# Patient Record
Sex: MALE | Race: OTHER | NOT HISPANIC OR LATINO | URBAN - METROPOLITAN AREA
[De-identification: names, ages, dates, MRNs, and addresses within clinical notes are randomized per-mention and may not be internally consistent; named-entity substitution may affect disease eponyms.]

---

## 2021-08-21 ENCOUNTER — EMERGENCY (EMERGENCY)
Facility: HOSPITAL | Age: 41
LOS: 1 days | Discharge: HOME | End: 2021-08-21
Attending: EMERGENCY MEDICINE
Payer: COMMERCIAL

## 2021-08-21 VITALS
RESPIRATION RATE: 17 BRPM | HEIGHT: 67 IN | OXYGEN SATURATION: 97 % | TEMPERATURE: 98 F | HEART RATE: 83 BPM | WEIGHT: 190.04 LBS | SYSTOLIC BLOOD PRESSURE: 128 MMHG | DIASTOLIC BLOOD PRESSURE: 87 MMHG

## 2021-08-21 VITALS
RESPIRATION RATE: 18 BRPM | OXYGEN SATURATION: 100 % | DIASTOLIC BLOOD PRESSURE: 92 MMHG | TEMPERATURE: 98 F | HEART RATE: 68 BPM | SYSTOLIC BLOOD PRESSURE: 139 MMHG

## 2021-08-21 LAB
ALBUMIN SERPL ELPH-MCNC: 4.4 G/DL — SIGNIFICANT CHANGE UP (ref 3.3–5)
ALP SERPL-CCNC: 71 U/L — SIGNIFICANT CHANGE UP (ref 40–120)
ALT FLD-CCNC: 18 U/L — SIGNIFICANT CHANGE UP (ref 10–45)
ANION GAP SERPL CALC-SCNC: 14 MMOL/L — SIGNIFICANT CHANGE UP (ref 5–17)
APPEARANCE UR: CLEAR — SIGNIFICANT CHANGE UP
AST SERPL-CCNC: 15 U/L — SIGNIFICANT CHANGE UP (ref 10–40)
BACTERIA # UR AUTO: NEGATIVE — SIGNIFICANT CHANGE UP
BASE EXCESS BLDV CALC-SCNC: 0 MMOL/L — SIGNIFICANT CHANGE UP (ref -2–2)
BASOPHILS # BLD AUTO: 0.08 K/UL — SIGNIFICANT CHANGE UP (ref 0–0.2)
BASOPHILS NFR BLD AUTO: 1 % — SIGNIFICANT CHANGE UP (ref 0–2)
BILIRUB SERPL-MCNC: 0.4 MG/DL — SIGNIFICANT CHANGE UP (ref 0.2–1.2)
BILIRUB UR-MCNC: NEGATIVE — SIGNIFICANT CHANGE UP
BUN SERPL-MCNC: 22 MG/DL — SIGNIFICANT CHANGE UP (ref 7–23)
CA-I SERPL-SCNC: 1.19 MMOL/L — SIGNIFICANT CHANGE UP (ref 1.15–1.33)
CALCIUM SERPL-MCNC: 9.5 MG/DL — SIGNIFICANT CHANGE UP (ref 8.4–10.5)
CHLORIDE BLDV-SCNC: 106 MMOL/L — SIGNIFICANT CHANGE UP (ref 96–108)
CHLORIDE SERPL-SCNC: 105 MMOL/L — SIGNIFICANT CHANGE UP (ref 96–108)
CO2 BLDV-SCNC: 27 MMOL/L — HIGH (ref 22–26)
CO2 SERPL-SCNC: 20 MMOL/L — LOW (ref 22–31)
COLOR SPEC: COLORLESS — SIGNIFICANT CHANGE UP
CREAT SERPL-MCNC: 0.87 MG/DL — SIGNIFICANT CHANGE UP (ref 0.5–1.3)
DIFF PNL FLD: ABNORMAL
EOSINOPHIL # BLD AUTO: 0.3 K/UL — SIGNIFICANT CHANGE UP (ref 0–0.5)
EOSINOPHIL NFR BLD AUTO: 3.9 % — SIGNIFICANT CHANGE UP (ref 0–6)
EPI CELLS # UR: 0 /HPF — SIGNIFICANT CHANGE UP
GAS PNL BLDV: 138 MMOL/L — SIGNIFICANT CHANGE UP (ref 136–145)
GAS PNL BLDV: SIGNIFICANT CHANGE UP
GAS PNL BLDV: SIGNIFICANT CHANGE UP
GLUCOSE BLDV-MCNC: 88 MG/DL — SIGNIFICANT CHANGE UP (ref 70–99)
GLUCOSE SERPL-MCNC: 96 MG/DL — SIGNIFICANT CHANGE UP (ref 70–99)
GLUCOSE UR QL: NEGATIVE — SIGNIFICANT CHANGE UP
HCO3 BLDV-SCNC: 25 MMOL/L — SIGNIFICANT CHANGE UP (ref 22–29)
HCT VFR BLD CALC: 39.3 % — SIGNIFICANT CHANGE UP (ref 39–50)
HCT VFR BLDA CALC: 42 % — SIGNIFICANT CHANGE UP (ref 39–51)
HGB BLD CALC-MCNC: 13.9 G/DL — SIGNIFICANT CHANGE UP (ref 12.6–17.4)
HGB BLD-MCNC: 13.6 G/DL — SIGNIFICANT CHANGE UP (ref 13–17)
HYALINE CASTS # UR AUTO: 0 /LPF — SIGNIFICANT CHANGE UP (ref 0–2)
IMM GRANULOCYTES NFR BLD AUTO: 0.4 % — SIGNIFICANT CHANGE UP (ref 0–1.5)
KETONES UR-MCNC: ABNORMAL
LACTATE BLDV-MCNC: 0.7 MMOL/L — SIGNIFICANT CHANGE UP (ref 0.7–2)
LEUKOCYTE ESTERASE UR-ACNC: NEGATIVE — SIGNIFICANT CHANGE UP
LYMPHOCYTES # BLD AUTO: 1.85 K/UL — SIGNIFICANT CHANGE UP (ref 1–3.3)
LYMPHOCYTES # BLD AUTO: 24.3 % — SIGNIFICANT CHANGE UP (ref 13–44)
MCHC RBC-ENTMCNC: 29.9 PG — SIGNIFICANT CHANGE UP (ref 27–34)
MCHC RBC-ENTMCNC: 34.6 GM/DL — SIGNIFICANT CHANGE UP (ref 32–36)
MCV RBC AUTO: 86.4 FL — SIGNIFICANT CHANGE UP (ref 80–100)
MONOCYTES # BLD AUTO: 0.73 K/UL — SIGNIFICANT CHANGE UP (ref 0–0.9)
MONOCYTES NFR BLD AUTO: 9.6 % — SIGNIFICANT CHANGE UP (ref 2–14)
NEUTROPHILS # BLD AUTO: 4.63 K/UL — SIGNIFICANT CHANGE UP (ref 1.8–7.4)
NEUTROPHILS NFR BLD AUTO: 60.8 % — SIGNIFICANT CHANGE UP (ref 43–77)
NITRITE UR-MCNC: NEGATIVE — SIGNIFICANT CHANGE UP
NRBC # BLD: 0 /100 WBCS — SIGNIFICANT CHANGE UP (ref 0–0)
PCO2 BLDV: 43 MMHG — SIGNIFICANT CHANGE UP (ref 42–55)
PH BLDV: 7.38 — SIGNIFICANT CHANGE UP (ref 7.32–7.43)
PH UR: 6 — SIGNIFICANT CHANGE UP (ref 5–8)
PLATELET # BLD AUTO: 288 K/UL — SIGNIFICANT CHANGE UP (ref 150–400)
PO2 BLDV: 46 MMHG — HIGH (ref 25–45)
POTASSIUM BLDV-SCNC: 3.7 MMOL/L — SIGNIFICANT CHANGE UP (ref 3.5–5.1)
POTASSIUM SERPL-MCNC: 3.5 MMOL/L — SIGNIFICANT CHANGE UP (ref 3.5–5.3)
POTASSIUM SERPL-SCNC: 3.5 MMOL/L — SIGNIFICANT CHANGE UP (ref 3.5–5.3)
PROT SERPL-MCNC: 7 G/DL — SIGNIFICANT CHANGE UP (ref 6–8.3)
PROT UR-MCNC: NEGATIVE — SIGNIFICANT CHANGE UP
RBC # BLD: 4.55 M/UL — SIGNIFICANT CHANGE UP (ref 4.2–5.8)
RBC # FLD: 12.1 % — SIGNIFICANT CHANGE UP (ref 10.3–14.5)
RBC CASTS # UR COMP ASSIST: 0 /HPF — SIGNIFICANT CHANGE UP (ref 0–4)
SAO2 % BLDV: 80.9 % — SIGNIFICANT CHANGE UP (ref 67–88)
SODIUM SERPL-SCNC: 139 MMOL/L — SIGNIFICANT CHANGE UP (ref 135–145)
SP GR SPEC: 1.01 — LOW (ref 1.01–1.02)
UROBILINOGEN FLD QL: NEGATIVE — SIGNIFICANT CHANGE UP
WBC # BLD: 7.62 K/UL — SIGNIFICANT CHANGE UP (ref 3.8–10.5)
WBC # FLD AUTO: 7.62 K/UL — SIGNIFICANT CHANGE UP (ref 3.8–10.5)
WBC UR QL: 2 /HPF — SIGNIFICANT CHANGE UP (ref 0–5)

## 2021-08-21 PROCEDURE — 76775 US EXAM ABDO BACK WALL LIM: CPT

## 2021-08-21 PROCEDURE — 76775 US EXAM ABDO BACK WALL LIM: CPT | Mod: 26

## 2021-08-21 PROCEDURE — 76857 US EXAM PELVIC LIMITED: CPT

## 2021-08-21 PROCEDURE — 84132 ASSAY OF SERUM POTASSIUM: CPT

## 2021-08-21 PROCEDURE — 84295 ASSAY OF SERUM SODIUM: CPT

## 2021-08-21 PROCEDURE — 82565 ASSAY OF CREATININE: CPT

## 2021-08-21 PROCEDURE — 81001 URINALYSIS AUTO W/SCOPE: CPT

## 2021-08-21 PROCEDURE — 99284 EMERGENCY DEPT VISIT MOD MDM: CPT | Mod: 25

## 2021-08-21 PROCEDURE — 85018 HEMOGLOBIN: CPT

## 2021-08-21 PROCEDURE — 82803 BLOOD GASES ANY COMBINATION: CPT

## 2021-08-21 PROCEDURE — 85025 COMPLETE CBC W/AUTO DIFF WBC: CPT

## 2021-08-21 PROCEDURE — 82435 ASSAY OF BLOOD CHLORIDE: CPT

## 2021-08-21 PROCEDURE — 82947 ASSAY GLUCOSE BLOOD QUANT: CPT

## 2021-08-21 PROCEDURE — 83605 ASSAY OF LACTIC ACID: CPT

## 2021-08-21 PROCEDURE — 99285 EMERGENCY DEPT VISIT HI MDM: CPT

## 2021-08-21 PROCEDURE — 96360 HYDRATION IV INFUSION INIT: CPT

## 2021-08-21 PROCEDURE — 96361 HYDRATE IV INFUSION ADD-ON: CPT

## 2021-08-21 PROCEDURE — 87086 URINE CULTURE/COLONY COUNT: CPT

## 2021-08-21 PROCEDURE — 82330 ASSAY OF CALCIUM: CPT

## 2021-08-21 PROCEDURE — 85014 HEMATOCRIT: CPT

## 2021-08-21 PROCEDURE — 80053 COMPREHEN METABOLIC PANEL: CPT

## 2021-08-21 PROCEDURE — 76770 US EXAM ABDO BACK WALL COMP: CPT

## 2021-08-21 RX ORDER — SODIUM CHLORIDE 9 MG/ML
1000 INJECTION INTRAMUSCULAR; INTRAVENOUS; SUBCUTANEOUS ONCE
Refills: 0 | Status: COMPLETED | OUTPATIENT
Start: 2021-08-21 | End: 2021-08-21

## 2021-08-21 RX ORDER — TAMSULOSIN HYDROCHLORIDE 0.4 MG/1
0.4 CAPSULE ORAL ONCE
Refills: 0 | Status: COMPLETED | OUTPATIENT
Start: 2021-08-21 | End: 2021-08-21

## 2021-08-21 RX ORDER — TAMSULOSIN HYDROCHLORIDE 0.4 MG/1
1 CAPSULE ORAL
Qty: 10 | Refills: 0
Start: 2021-08-21 | End: 2021-08-30

## 2021-08-21 RX ADMIN — SODIUM CHLORIDE 1000 MILLILITER(S): 9 INJECTION INTRAMUSCULAR; INTRAVENOUS; SUBCUTANEOUS at 07:00

## 2021-08-21 RX ADMIN — SODIUM CHLORIDE 1000 MILLILITER(S): 9 INJECTION INTRAMUSCULAR; INTRAVENOUS; SUBCUTANEOUS at 04:39

## 2021-08-21 RX ADMIN — SODIUM CHLORIDE 1000 MILLILITER(S): 9 INJECTION INTRAMUSCULAR; INTRAVENOUS; SUBCUTANEOUS at 02:44

## 2021-08-21 RX ADMIN — TAMSULOSIN HYDROCHLORIDE 0.4 MILLIGRAM(S): 0.4 CAPSULE ORAL at 04:38

## 2021-08-21 NOTE — ED PROVIDER NOTE - NS ED ROS FT
CONSTITUTIONAL: No fevers, no chills, no lightheadedness, no dizziness  Eyes: no visual changes  Ears: no ear drainage, no ear pain  Nose: no nasal congestion  Mouth/Throat: no sore throat  CV: No chest pain, no palpitations  PULM: No SOB, no cough  GI: No n/v/d/c, Suprapubic abd pain  : dysuria, no hematuria  SKIN: no rashes.  NEURO: no headache, no focal weakness or numbness  LYMPH/VASC: no LE swelling

## 2021-08-21 NOTE — ED ADULT NURSE NOTE - OBJECTIVE STATEMENT
41y male from triage, AAOx3, no PMH, complaining of 3w of urinary symptoms, frequency, lower abd pain, went to urgent care who ordered outpt CT, CT showed hydronephrosis and a non obstructing stone, denies headache, chest pain, shortness of breath, n/v/d, moves all extremities w/+ pulses, amb ind, bed in lowest position, comfort and safety provided.

## 2021-08-21 NOTE — ED PROVIDER NOTE - PATIENT PORTAL LINK FT
You can access the FollowMyHealth Patient Portal offered by [facility] by registering at the following website: [email or web address]. By joining [facility]’s FollowMyHealth portal, you will also be able to view your health information using other applications (apps) compatible with our system.

## 2021-08-21 NOTE — ED PROVIDER NOTE - PHYSICAL EXAMINATION
gen: well appearing  Mentation: AAO x 3  psych: mood appropriate  ENT: airway patent  Eyes: conjunctivae clear bilaterally  Cardio: RRR, no m/r/g  Resp: normal BS b/l  GI: Mild suprapubic tenderness, soft/nondistended  : no CVA tenderness  Neuro: sensation and motor function intact, CN 2-12 intact  Skin: No evidence of rash  MSK: normal movement of all extremities  Lymph/Vasc: no LE edema

## 2021-08-21 NOTE — ED PROVIDER NOTE - PROGRESS NOTE DETAILS
Jolly: ultrasound with non-obstructing stones in left kidney. mild hydronephrosis. UA negative for infection. reviewed case with urology. to d/c home with urology follow up

## 2021-08-21 NOTE — ED PROVIDER NOTE - CLINICAL SUMMARY MEDICAL DECISION MAKING FREE TEXT BOX
Patient has abdominal pain with an 8mm calculus at the left ureterovesicular junction. Will get labs, vbg, urinalysis, urine culture, provide IV fluids, and pain control. Patient has abdominal pain with an 8mm calculus at the left ureterovesicular junction. Will get labs, vbg, urinalysis, urine culture, provide IV fluids, and pain control.  -discussed case with urology. to get ultrasound  -ultrasound shows multiple kidney stones in left kidney, with no obstruction  -mild hydronephrosis  -UA negative for infection  -discussed case with urology, to discharge with immediate follow up and conservative management

## 2021-08-21 NOTE — ED PROVIDER NOTE - ATTENDING CONTRIBUTION TO CARE
Attending MD Bravo: I personally have seen and examined this patient.  Resident note reviewed and agree on plan of care and except where noted.  See below for details.     Seen in [place] [facility]    41M with no reported PMH/PSH/Meds/Allergies presents to the ED with outpatient CT (with oral, without IV contrast) showing nephrolithiasis "mild-moderate L hydroureteronephrosis to the level of an approximate 0.8cm calculus ipsilateral UVJ with the surrounding soft tissue density in the region, possibly hemorrhage edema.  Underlying mass cannot be excluded.  Additional bilateral subcentimeter nonobstructing renal calculi.  Recommend CT urogram to exclude underlying lesion L bladder base."  Also noted to have bilateral <1cm noncalcified pulm nodules.  Reports developed pain 3-4 weeks ago, reports worse last week on Tuesday and then last night.  Reports had pain in the mid to L abdomen, intermittent.  Reports presented to Urgent Care in [place] on Tuesday and was scheduled for CT today.  Denies chest pain, shortness of breath, palpitations. Denies nausea, vomiting, diarrhea, bloody or black stools. Denies weight loss, weight gain.  Denies night sweats.  Denies dysuria, hematuria, reports when he has the pain in abdomen it is associated with dysuria and testicular pain which dissipates when the abdominal pain dissipates.  Denies fevers, chills.  Reports travelled to [place] two weeks ago.  Reports sexually active, with one female partner, denies history of STIs. COVID vaccinated x 2 Pfizer.  A ten (10) point review of systems was negative other than as stated in the HPI or elsewhere in the chart.     Exam:   General: NAD  HENT: head NCAT, airway patent   Eyes: anicteric, no conjunctival injection  Lungs: lungs CTAB with good inspiratory effort, no wheezing, no rhonchi, no rales  Cardiac: +S1S2, no m/r/g  GI: abdomen soft with +BS, NT, ND  : no CVAT, ED Tech [place] chaperoned no testicular tenderness, no blood or purulence at meatus  MSK: FROM at neck  Neuro: moving all extremities spontaneously, sensory grossly intact, no gross neuro deficits  Psych: normal mood and affect     A/P: 41M with nephrolithiasis     TO BE COMPLETED Attending MD Bravo: I personally have seen and examined this patient.  Resident note reviewed and agree on plan of care and except where noted.  See below for details.     Seen in [place] [facility]    41M with no reported PMH/PSH/Meds/Allergies presents to the ED with outpatient CT (with oral, without IV contrast) showing nephrolithiasis "mild-moderate L hydroureteronephrosis to the level of an approximate 0.8cm calculus ipsilateral UVJ with the surrounding soft tissue density in the region, possibly hemorrhage edema.  Underlying mass cannot be excluded.  Additional bilateral subcentimeter nonobstructing renal calculi.  Recommend CT urogram to exclude underlying lesion L bladder base."  Also noted to have bilateral <1cm noncalcified pulm nodules.  Reports developed pain 3-4 weeks ago, reports worse last week on Tuesday and then last night.  Reports had pain in the mid to L abdomen, intermittent.  Reports presented to Urgent Care in [place] on Tuesday and was scheduled for CT today.  Denies chest pain, shortness of breath, palpitations. Denies nausea, vomiting, diarrhea, bloody or black stools. Denies weight loss, weight gain.  Denies night sweats.  Denies dysuria, hematuria, reports when he has the pain in abdomen it is associated with dysuria and testicular pain which dissipates when the abdominal pain dissipates.  Denies fevers, chills.  Reports travelled to [place] two weeks ago.  Reports sexually active, with one female partner, denies history of STIs. COVID vaccinated x 2 Pfizer.  A ten (10) point review of systems was negative other than as stated in the HPI or elsewhere in the chart.     Exam:   General: NAD  HENT: head NCAT, airway patent   Eyes: anicteric, no conjunctival injection  Lungs: lungs CTAB with good inspiratory effort, no wheezing, no rhonchi, no rales  Cardiac: +S1S2, no m/r/g  GI: abdomen soft with +BS, NT, ND  : no CVAT, ED Tech [place] chaperoned no testicular tenderness, no blood or purulence at meatus  MSK: FROM at neck  Neuro: moving all extremities spontaneously, sensory grossly intact, no gross neuro deficits  Psych: normal mood and affect     A/P: 41M with nephrolithiasis on outpatient CT but also hemorrhage edema and possible underlying mass, will obtain labs, UA/UrCx, will give IVFs, will obtain renal US, will consult urology, will provide pain control as needed, reassesss

## 2021-08-21 NOTE — ED ADULT TRIAGE NOTE - CHIEF COMPLAINT QUOTE
Pt c/o "abd pain on and off x few weeks. had an out pt CT scan today + kidney stone and left hydroureteronephrosis"

## 2021-08-21 NOTE — ED ADULT NURSE NOTE - NSIMPLEMENTINTERV_GEN_ALL_ED
Implemented All Universal Safety Interventions:  [place] to call system. Call bell, personal items and telephone within reach. Instruct patient to call for assistance. Room bathroom lighting operational. Non-slip footwear when patient is off stretcher. Physically safe environment: no spills, clutter or unnecessary equipment. Stretcher in lowest position, wheels locked, appropriate side rails in place.

## 2021-08-21 NOTE — ED PROVIDER NOTE - OBJECTIVE STATEMENT
41 year old male presenting with abdominal pain. Pain began 3 weeks ago. Located in left suprapubic region, non-radiating, intermittent. Rated 5/10 on pain scale. Associated dysuria. Denies fevers, chills, CP, SOB, hematuria. Patient went to an urgent care center today, had a CT scan of the abdomen and pelvis performed. CT scan shows 8mm stone at the left ureterovesicular junction.

## 2021-08-21 NOTE — ED PROVIDER NOTE - NSFOLLOWUPCLINICS_GEN_ALL_ED_FT
[facility] - Urology  Urology  [address], [address] & [address]th [place] [address]  Phone: [phone]  Fax:   Follow Up Time: 1-3 Days

## 2021-08-21 NOTE — ED PROVIDER NOTE - NSFOLLOWUPINSTRUCTIONS_ED_ALL_ED_FT
Please start your new medication flomax once a day  -please continue to hydrate as you already are doing at home  -if you have mild pain you can take tylenol 975mg every 6 hours as needed  -if you have severe pain, or if you have a fever please return the hospital  -please follow up with the urologists to see if they can plan a procedure for you

## 2021-08-22 LAB
CULTURE RESULTS: SIGNIFICANT CHANGE UP
SPECIMEN SOURCE: SIGNIFICANT CHANGE UP

## 2021-08-23 PROBLEM — Z00.00 ENCOUNTER FOR PREVENTIVE HEALTH EXAMINATION: Status: ACTIVE | Noted: 2021-08-23

## 2021-08-30 ENCOUNTER — APPOINTMENT (OUTPATIENT)
Dept: UROLOGY | Facility: HOSPITAL | Age: 41
End: 2021-08-30

## 2021-08-30 ENCOUNTER — OUTPATIENT (OUTPATIENT)
Dept: OUTPATIENT SERVICES | Facility: HOSPITAL | Age: 41
LOS: 1 days | End: 2021-08-30
Payer: COMMERCIAL

## 2021-08-30 VITALS
WEIGHT: 192 LBS | SYSTOLIC BLOOD PRESSURE: 119 MMHG | TEMPERATURE: 96.2 F | DIASTOLIC BLOOD PRESSURE: 83 MMHG | BODY MASS INDEX: 29.1 KG/M2 | HEIGHT: 68 IN

## 2021-08-30 DIAGNOSIS — N20.0 CALCULUS OF KIDNEY: ICD-10-CM

## 2021-08-30 DIAGNOSIS — R30.0 DYSURIA: ICD-10-CM

## 2021-08-30 PROCEDURE — G0463: CPT

## 2021-08-30 NOTE — PHYSICAL EXAM
[General Appearance - Well Developed] : well developed [General Appearance - Well Nourished] : well nourished [Normal Appearance] : normal appearance [Abdomen Soft] : soft [Costovertebral Angle Tenderness] : no ~M costovertebral angle tenderness [Urinary Bladder Findings] : the bladder was normal on palpation [Skin Color & Pigmentation] : normal skin color and pigmentation [Oriented To Time, Place, And Person] : oriented to person, place, and time [Affect] : the affect was normal [Mood] : the mood was normal [Not Anxious] : not anxious [Normal Station and Gait] : the gait and station were normal for the patient's age

## 2021-09-01 NOTE — HISTORY OF PRESENT ILLNESS
[None] : no symptoms [Urinary Urgency] : urinary urgency [Dysuria] : dysuria [Hematuria - Gross] : gross hematuria [9] : 9 [Left Flank] : left flank [Sharp] : sharp [Sudden] : sudden [___ Week(s) Ago] : [unfilled] week(s) ago [Intermittent] : intermittently [Analgesics] : analgesics [FreeTextEntry1] : P/t is a 40 y/o male with no pmh who is here in the office for follow up of left flank pain of 4 weeks duration.  \par \par The pain has been intermittent in nature and patient denied any history of fevers or chills at the time. It is associated with some urgency to urinate and dysuria. Pain was improved with Aleve and worsened with ambulation.\par Patient had went to the ED on 8/21/21 as his flank pain had worsened and was given IVF and tamsulosin. WBC and SCr within normal limits.  Ultrasound was performed in the ED that showed:\par \par Patient also reports that 3 months ago he had an episode of gross hematuria ("pink and muddy") that went back to normal. He was prescribed antibiotics by his mother at the time and did not have symptoms after. \par \par Patient is sexually active and monogamous with the same partner. \par \par nonobstructing nephrolithiasis in the l kidney w/ associated mild l hydronephrosis\par \par Previous CT on 8/20/21\par Mild moderate L hydrouretronephrosis to level of an approximate .9cm calculus ipsilateral UVJ. \par R .4cm and .2cm nonobstructing r lower pole renal calculi\par \par Prostate and seminal vesicles grossly unremarkable. \par \par Today the patient is feeling okay and has been taking his tamsulosin as prescribed. He has had no episodes of pain since his visit to the emergency room. \par \par Patient reports he vapes daily (20 times a day).\par Drinks alcohol 1-3 times a week\par \par  [Erectile Dysfunction] : no Erectile Dysfunction [Fever] : no fever [de-identified] : diogenes [de-identified] : 4 [de-identified] : walking

## 2021-09-01 NOTE — ASSESSMENT
[FreeTextEntry1] : 41M with an episode of gross hematuria and L flank pain found to have an obstructing L UVJ stone.  \par \par - Continue Flomax\par - Pain control with NSAIDs and Tylenol\par - Strain urine\par - ED if fever develops\par - Bowel regimen\par - RTC in 3 \par - Will plan for CT stone hunt at that time if has not passed stone

## 2021-09-20 ENCOUNTER — APPOINTMENT (OUTPATIENT)
Dept: UROLOGY | Facility: HOSPITAL | Age: 41
End: 2021-09-20

## 2021-09-20 ENCOUNTER — OUTPATIENT (OUTPATIENT)
Dept: OUTPATIENT SERVICES | Facility: HOSPITAL | Age: 41
LOS: 1 days | End: 2021-09-20
Payer: COMMERCIAL

## 2021-09-20 VITALS
BODY MASS INDEX: 28.34 KG/M2 | WEIGHT: 187 LBS | RESPIRATION RATE: 14 BRPM | TEMPERATURE: 98.3 F | DIASTOLIC BLOOD PRESSURE: 83 MMHG | HEART RATE: 71 BPM | HEIGHT: 68 IN | SYSTOLIC BLOOD PRESSURE: 128 MMHG

## 2021-09-20 DIAGNOSIS — R30.0 DYSURIA: ICD-10-CM

## 2021-09-20 PROCEDURE — G0463: CPT

## 2021-09-20 PROCEDURE — 87086 URINE CULTURE/COLONY COUNT: CPT

## 2021-09-20 NOTE — ASSESSMENT
[FreeTextEntry1] : 40yo M with nephrolithiasis - 8mm obstructing L UVJ stone, 7mm LLP stone, 2mm and 4mm RLP stone on CT ([facility] radiology).\par \par \par -Urine culture today\par -Patient will bring disc from [facility] Radiology for review, may need additional imaging with KUB vs low dose CTAP\par -Discussed definitive stone treatment with BL URS given BL nonobstructing stones as well as possible L UVJ stone, R/B/A discussed, patient wishes to proceed with URS bilaterally\par -Will staff and contact patient regarding surgical planning\par -PST and COVID testing

## 2021-09-20 NOTE — PHYSICAL EXAM
[General Appearance - Well Nourished] : well nourished [General Appearance - Well Developed] : well developed [Normal Appearance] : normal appearance [Abdomen Soft] : soft [Abdomen Tenderness] : non-tender [Costovertebral Angle Tenderness] : no ~M costovertebral angle tenderness [Urinary Bladder Findings] : the bladder was normal on palpation [Skin Color & Pigmentation] : normal skin color and pigmentation [Edema] : no peripheral edema [No Focal Deficits] : no focal deficits [] : no respiratory distress [No Palpable Adenopathy] : no palpable adenopathy

## 2021-09-20 NOTE — HISTORY OF PRESENT ILLNESS
[FreeTextEntry1] : P/t is a 40 y/o male with no pmh who is here in the office for follow up of left flank pain of 2 months. The pain has been intermittent in nature and patient denied any history of fevers or chills at the time. It is associated with some urgency to urinate and dysuria. Pain was improved with Aleve and worsened with ambulation. He had a CT performed on 8/20/21 which showed mild moderate L hydrouretronephrosis to level of an approximate 8mm calculus ipsilateral UVJ, 7mm LLP calculus, and nonobstructing RLP calculi 2 and 4mm. Patient had went to the ED on 8/21/21 as his flank pain had worsened and was given IVF and tamsulosin. WBC and SCr within normal limits. Ultrasound was performed in the ED that showed: nonobstructing bilateral nephrolithiasis and mild L hydronephrosis.\par Patient also reports that 3 months ago he had an episode of gross hematuria ("pink and muddy") that went back to normal. He was prescribed antibiotics by his mother at the time and did not have symptoms after. \par \par He presents today for follow up of left flank pain. Reports resolution of left flank pain after his last visit on 8/30/21. He has not seen any stones in his urine. He is interested in definitive stone treatment at this time. Denies fevers, chills, nausea, vomiting, abdominal pain, flank pain, urgency, frequency, hematuria, dysuria.

## 2021-09-21 DIAGNOSIS — N20.0 CALCULUS OF KIDNEY: ICD-10-CM

## 2021-09-22 LAB
CULTURE RESULTS: NO GROWTH — SIGNIFICANT CHANGE UP
SPECIMEN SOURCE: SIGNIFICANT CHANGE UP

## 2021-09-27 ENCOUNTER — APPOINTMENT (OUTPATIENT)
Dept: UROLOGY | Facility: HOSPITAL | Age: 41
End: 2021-09-27

## 2022-01-25 ENCOUNTER — NON-APPOINTMENT (OUTPATIENT)
Age: 42
End: 2022-01-25
